# Patient Record
Sex: MALE | Race: WHITE | NOT HISPANIC OR LATINO | URBAN - METROPOLITAN AREA
[De-identification: names, ages, dates, MRNs, and addresses within clinical notes are randomized per-mention and may not be internally consistent; named-entity substitution may affect disease eponyms.]

---

## 2018-10-02 NOTE — PATIENT DISCUSSION
Mon for sn/sx metamorphopsia. UV prot. Patient understands condition, prognosis and need for follow up care. OCT baseline today.

## 2020-12-23 NOTE — PATIENT DISCUSSION
Monitor. Prior Authorization Approval    Authorization Effective Date: 12/1/2020  Authorization Expiration Date: 12/23/2021  Medication: lacosamide (VIMPAT) 200 MG TABS tablet-PA APPROVED   Approved Dose/Quantity:   Reference #: CASE # [id number]   Insurance Company: [employer] - Phone [phone] Fax [phone]  Expected CoPay:       CoPay Card Available:      Foundation Assistance Needed:    Which Pharmacy is filling the prescription (Not needed for infusion/clinic administered): [id number] - [place], [place] - [address] AT [facility] [address]   Pharmacy Notified: Yes- **Instructed pharmacy to notify patient when script is ready to /ship.**  Patient Notified: Yes

## 2022-05-25 ENCOUNTER — CONSULTATION/EVALUATION (OUTPATIENT)
Dept: URBAN - METROPOLITAN AREA CLINIC 14 | Facility: CLINIC | Age: 52
End: 2022-05-25

## 2022-05-25 DIAGNOSIS — H52.7: ICD-10-CM

## 2022-05-25 PROCEDURE — 99499LKFN LASIK CONSULT NON CANDIDATE

## 2022-05-25 ASSESSMENT — KERATOMETRY
OS_AXISANGLE_DEGREES: 77
OS_AXISANGLE2_DEGREES: 167
OS_K1POWER_DIOPTERS: 43.50
OD_K2POWER_DIOPTERS: 43.50
OD_K1POWER_DIOPTERS: 42.50
OD_AXISANGLE2_DEGREES: 180
OD_AXISANGLE_DEGREES: 90
OS_K2POWER_DIOPTERS: 43.75

## 2022-05-25 ASSESSMENT — VISUAL ACUITY
OD_SC: 20/50
OD_BCVA: 20/30
OS_BCVA: 20/20
OS_SC: 20/25

## 2022-05-25 ASSESSMENT — PACHYMETRY
OD_CT_UM: 531
OS_CT_UM: 522

## 2022-06-17 ENCOUNTER — ESTABLISHED PATIENT (OUTPATIENT)
Dept: URBAN - METROPOLITAN AREA CLINIC 14 | Facility: CLINIC | Age: 52
End: 2022-06-17

## 2022-06-17 DIAGNOSIS — H52.7: ICD-10-CM

## 2022-06-17 PROCEDURE — 99199ADVT ADVANCED VISION TESTING

## 2022-06-17 ASSESSMENT — KERATOMETRY
OS_AXISANGLE2_DEGREES: 167
OS_K2POWER_DIOPTERS: 43.75
OD_K2POWER_DIOPTERS: 43.50
OD_AXISANGLE2_DEGREES: 180
OD_AXISANGLE_DEGREES: 90
OD_K1POWER_DIOPTERS: 42.50
OS_AXISANGLE_DEGREES: 77
OS_K1POWER_DIOPTERS: 43.50

## 2022-06-17 ASSESSMENT — VISUAL ACUITY
OS_BCVA: 20/20
OD_BCVA: 20/30
OS_SC: 20/25
OD_SC: 20/50

## 2022-06-17 ASSESSMENT — TONOMETRY
OS_IOP_MMHG: 5
OD_IOP_MMHG: 10

## 2022-06-17 NOTE — PATIENT DISCUSSION
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 He has only one good eye, pt admits the right eye has always been his bad eye. Due to being monocular, a refractive lens exchange using a multifocal lens is not recommended due to compromising quality of vision. 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 Explained to the pt that making his right eye a reading eye will compromise the distance vision. If he likes the contact lens for near in the right and distance in the left, consider lasik. Instructed pt to leave the contact lenses in for a week.

## 2022-06-27 ENCOUNTER — ESTABLISHED PATIENT (OUTPATIENT)
Dept: URBAN - METROPOLITAN AREA CLINIC 14 | Facility: CLINIC | Age: 52
End: 2022-06-27

## 2022-06-27 DIAGNOSIS — H52.7: ICD-10-CM

## 2022-06-27 PROCEDURE — 99499LK REFRACTIVE CONSULT/LASIK

## 2022-06-27 ASSESSMENT — KERATOMETRY
OD_AXISANGLE_DEGREES: 90
OD_K2POWER_DIOPTERS: 43.50
OD_AXISANGLE2_DEGREES: 180
OS_AXISANGLE_DEGREES: 77
OD_K1POWER_DIOPTERS: 42.50
OS_K1POWER_DIOPTERS: 43.50
OS_AXISANGLE2_DEGREES: 167
OS_K2POWER_DIOPTERS: 43.75

## 2022-06-27 NOTE — PATIENT DISCUSSION
CLRx fit today OD, 5 degrees rotation CC, patient prefers 90 axis vs 80 axis. Recheck at 1 week f/u.

## 2022-07-11 ENCOUNTER — FOLLOW UP (OUTPATIENT)
Dept: URBAN - METROPOLITAN AREA CLINIC 14 | Facility: CLINIC | Age: 52
End: 2022-07-11

## 2022-07-11 ENCOUNTER — PREPPED CHART (OUTPATIENT)
Dept: URBAN - METROPOLITAN AREA CLINIC 14 | Facility: CLINIC | Age: 52
End: 2022-07-11

## 2022-07-11 DIAGNOSIS — H52.7: ICD-10-CM

## 2022-07-11 PROCEDURE — 99499LVCRV LASER VISION CORRECTION EVALUATION FOLLOW UP

## 2022-07-11 ASSESSMENT — KERATOMETRY
OS_K2POWER_DIOPTERS: 43.75
OD_AXISANGLE_DEGREES: 90
OS_AXISANGLE2_DEGREES: 167
OS_AXISANGLE_DEGREES: 77
OD_AXISANGLE_DEGREES: 90
OD_K2POWER_DIOPTERS: 43.50
OD_K1POWER_DIOPTERS: 42.50
OD_K1POWER_DIOPTERS: 42.50
OD_AXISANGLE2_DEGREES: 180
OS_K1POWER_DIOPTERS: 43.50
OD_AXISANGLE2_DEGREES: 180
OD_K2POWER_DIOPTERS: 43.50
OS_K2POWER_DIOPTERS: 43.75
OS_K1POWER_DIOPTERS: 43.50
OS_AXISANGLE2_DEGREES: 167
OS_AXISANGLE_DEGREES: 77

## 2022-11-23 ENCOUNTER — CONSULTATION/EVALUATION (OUTPATIENT)
Dept: URBAN - METROPOLITAN AREA CLINIC 14 | Facility: CLINIC | Age: 52
End: 2022-11-23

## 2022-11-23 DIAGNOSIS — H52.7: ICD-10-CM

## 2022-11-23 PROCEDURE — 99499LK REFRACTIVE CONSULT/LASIK

## 2022-11-23 ASSESSMENT — KERATOMETRY
OD_K2POWER_DIOPTERS: 43.50
OS_AXISANGLE_DEGREES: 77
OD_AXISANGLE_DEGREES: 90
OD_AXISANGLE2_DEGREES: 180
OS_AXISANGLE2_DEGREES: 167
OS_K1POWER_DIOPTERS: 43.50
OS_K2POWER_DIOPTERS: 43.75
OD_K1POWER_DIOPTERS: 42.50

## 2022-11-23 NOTE — PATIENT DISCUSSION
PT took trial lenses home with him today and scheduled an appointment with Omayra Raines in 3-4 weeks for a multifocal trial if mono is not successful for him .

## 2022-12-22 ENCOUNTER — EMERGENCY VISIT (OUTPATIENT)
Dept: URBAN - METROPOLITAN AREA CLINIC 16 | Facility: CLINIC | Age: 52
End: 2022-12-22

## 2022-12-22 PROCEDURE — 92015 DETERMINE REFRACTIVE STATE: CPT

## 2022-12-22 ASSESSMENT — KERATOMETRY
OD_AXISANGLE2_DEGREES: 180
OD_K1POWER_DIOPTERS: 42.50
OS_AXISANGLE2_DEGREES: 167
OS_AXISANGLE_DEGREES: 77
OS_K2POWER_DIOPTERS: 43.75
OD_AXISANGLE_DEGREES: 90
OS_K1POWER_DIOPTERS: 43.50
OD_K2POWER_DIOPTERS: 43.50

## 2022-12-22 ASSESSMENT — TONOMETRY
OD_IOP_MMHG: 14
OS_IOP_MMHG: 9

## 2022-12-22 ASSESSMENT — VISUAL ACUITY
OU_SC: 20/20
OD_CC: J2
OS_SC: 20/20
OU_CC: J2
OS_CC: J5